# Patient Record
Sex: MALE | URBAN - METROPOLITAN AREA
[De-identification: names, ages, dates, MRNs, and addresses within clinical notes are randomized per-mention and may not be internally consistent; named-entity substitution may affect disease eponyms.]

---

## 2020-08-13 ENCOUNTER — LAB ENCOUNTER (OUTPATIENT)
Dept: LAB | Facility: HOSPITAL | Age: 35
End: 2020-08-13
Attending: OBSTETRICS & GYNECOLOGY
Payer: COMMERCIAL

## 2020-08-13 DIAGNOSIS — Z01.812 PRE-OPERATIVE LABORATORY EXAMINATION: ICD-10-CM

## 2020-08-14 LAB — SARS-COV-2 RNA RESP QL NAA+PROBE: NOT DETECTED

## 2024-08-05 ENCOUNTER — HOSPITAL ENCOUNTER (OUTPATIENT)
Age: 39
Discharge: HOME | End: 2024-08-05
Payer: COMMERCIAL

## 2024-08-05 VITALS
WEIGHT: 195 LBS | HEIGHT: 71 IN | HEART RATE: 81 BPM | SYSTOLIC BLOOD PRESSURE: 107 MMHG | DIASTOLIC BLOOD PRESSURE: 82 MMHG | BODY MASS INDEX: 27.3 KG/M2 | OXYGEN SATURATION: 100 % | RESPIRATION RATE: 16 BRPM | TEMPERATURE: 99 F

## 2024-08-05 DIAGNOSIS — U07.1 COVID-19: Primary | ICD-10-CM

## 2024-08-05 PROCEDURE — 99203 OFFICE O/P NEW LOW 30 MIN: CPT | Performed by: NURSE PRACTITIONER

## 2024-08-05 RX ORDER — ESCITALOPRAM OXALATE 10 MG/1
10 TABLET ORAL DAILY
COMMUNITY

## 2024-08-05 NOTE — ED INITIAL ASSESSMENT (HPI)
Pt presents to the IC with c/o a cough, congestion, headache, body aches, chills, nausea and diarrhea since Friday. Tested positive for covid today. Pt is requesting paxlovid.

## 2024-08-05 NOTE — ED PROVIDER NOTES
Patient Seen in: [facility]    History   CC: covid +  HPI: Amari Laird 39 year old male w/ hx CVA secondary to PFO surgically corrected a few years ago who presents requesting Paxlovid after home COVID test positive.  Has had generalized fatigue and myalgias with cough, congestion, headaches, nausea without vomiting and diarrhea which have been present for the last 4 days.  Denies difficulty breathing, chest pain, hemoptysis, rash.       No family history on file.    Social History     Socioeconomic History    Marital status:    Tobacco Use    Smoking status: Never     Passive exposure: Never    Smokeless tobacco: Never       ROS:  Review of Systems    Positive for stated complaint: Covid+, Cough  Other systems are as noted in HPI.  Constitutional and vital signs reviewed.      All other systems reviewed and negative except as noted above.    PSFH elements reviewed from today and agreed except as otherwise stated in HPI.             Constitutional and vital signs reviewed.        Physical Exam     ED Triage Vitals [08/05/24 1538]   /82   Pulse 81   Resp 16   Temp 98.6 °F (37 °C)   Temp src Temporal   SpO2 100 %   O2 Device None (Room air)       Current:/82   Pulse 81   Temp 98.6 °F (37 °C) (Temporal)   Resp 16   Ht 180.3 cm (5' 11\")   Wt 88.5 kg   SpO2 100%   BMI 27.20 kg/m²         PE:  General - Appears well, non-toxic and in NAD  Head - Appears symmetrical without deformity/swelling cranium, scalp, or facial bones  Eyes - sclera not injected, no discharge noted, no periorbital edema  ENT - EAC bilaterally without discharge, TM pearly grey with COL visualized appropriately bilaterally.   no nasal drainage noted in nares bilat, no cobblestoning to post. Pharynx.   Oropharynx clear, posterior pharynx is without erythema and without tonsilar enlargement or exudate, uvula midline, +gag, voice is clear. No trismus  Neck - no significant adenopathy, supple with trachea  midline  Resp - Lung sounds clear bilaterally and wob unlabored, good aeration with equal, even expansion bilaterally   CV - RRR  Skin - no rashes or petechiae noted, pink warm and dry throughout, mmm, cap refill <2seconds  Neuro - A&O x4, steady gait  MSK - makes purposeful movements of all extremities, radial pulses 2+ bilat.  Psych - Interactive and appropriate      ED Course   Labs Reviewed - No data to display    MDM     Patient uses Lexapro and Ambien.  Discussed with patient risk for Ambien with concomitant Paxlovid use as this can increase the sedative effects of Ambien and advised to hold Ambien for a total of 8 days, 5 days while on Paxlovid and 3 full days following completion.  Patient states he cannot sleep at night without his Ambien and would rather not take Paxlovid instead.  Patient is well-appearing, stable vitals, without pulmonary history or immunocompromisation.  He is on day 4 of his symptoms.  Discussed with patient general COVID instructions and precautions, rest,  hydration instructions, Tylenol or Motrin as needed for discomfort, follow-up and return/ED precautions reviewed. Patient is historian and demonstrates understanding of all instruction and agrees with plan of care.      Disposition and Plan     Clinical Impression:  1. COVID-19        Disposition:  Discharge    Follow-up:  Richardson Sandoval MD  [address]  [facility] [id number] [phone]    Go in 3 days  As needed      Medications Prescribed:  Current Discharge Medication List